# Patient Record
Sex: FEMALE | Race: WHITE | NOT HISPANIC OR LATINO | Employment: UNEMPLOYED | URBAN - METROPOLITAN AREA
[De-identification: names, ages, dates, MRNs, and addresses within clinical notes are randomized per-mention and may not be internally consistent; named-entity substitution may affect disease eponyms.]

---
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
## 2025-01-08 DIAGNOSIS — Z30.011 ENCOUNTER FOR INITIAL PRESCRIPTION OF CONTRACEPTIVE PILLS: ICD-10-CM

## 2025-01-09 RX ORDER — LEVONORGESTREL AND ETHINYL ESTRADIOL 0.1-0.02MG
1 KIT ORAL DAILY
Qty: 84 TABLET | Refills: 0 | Status: SHIPPED | OUTPATIENT
Start: 2025-01-09

## 2025-01-09 NOTE — TELEPHONE ENCOUNTER
Spoke with patient and advised her that she needs an annual before we can reschedule her BC. She is currently scheduled on 04/01/2025 at 1:00 PM. Please advise if refill can be sent until patient's appt. Thank you!

## 2025-02-19 ENCOUNTER — TELEPHONE (OUTPATIENT)
Dept: OBGYN CLINIC | Facility: CLINIC | Age: 20
End: 2025-02-19

## 2025-02-19 DIAGNOSIS — Z30.011 ENCOUNTER FOR INITIAL PRESCRIPTION OF CONTRACEPTIVE PILLS: ICD-10-CM

## 2025-02-19 NOTE — TELEPHONE ENCOUNTER
Ms. Kruger has been waiting for her routine care appointment that was supposed to be done in January.  The only availability was an appointment in April 2025.  Please submit 2 refills on Ms. Kruger's birth control pills to the [facility] in [place], [place].

## 2025-02-20 RX ORDER — LEVONORGESTREL/ETHIN.ESTRADIOL 0.1-0.02MG
1 TABLET ORAL DAILY
Qty: 84 TABLET | Refills: 0 | Status: SHIPPED | OUTPATIENT
Start: 2025-02-20

## 2025-02-20 NOTE — TELEPHONE ENCOUNTER
Please advise if refill is appropriate. Patient is scheduled on 04/08/2025 for her annual exam with you. Thank you.